# Patient Record
Sex: MALE | Race: WHITE | Employment: UNEMPLOYED | URBAN - METROPOLITAN AREA
[De-identification: names, ages, dates, MRNs, and addresses within clinical notes are randomized per-mention and may not be internally consistent; named-entity substitution may affect disease eponyms.]

---

## 2018-08-03 RX ORDER — M-VIT,TX,IRON,MINS/CALC/FOLIC 27MG-0.4MG
1 TABLET ORAL DAILY
COMMUNITY

## 2018-08-03 RX ORDER — LOSARTAN POTASSIUM 100 MG/1
100 TABLET ORAL DAILY
COMMUNITY

## 2018-08-03 RX ORDER — METOPROLOL SUCCINATE 25 MG/1
25 TABLET, EXTENDED RELEASE ORAL DAILY
COMMUNITY

## 2018-08-03 RX ORDER — BRIMONIDINE TARTRATE, TIMOLOL MALEATE 2; 5 MG/ML; MG/ML
1 SOLUTION/ DROPS OPHTHALMIC 2 TIMES DAILY
COMMUNITY
End: 2018-10-30 | Stop reason: ALTCHOICE

## 2018-08-03 RX ORDER — OXYCODONE HYDROCHLORIDE AND ACETAMINOPHEN 5; 325 MG/1; MG/1
1 TABLET ORAL
COMMUNITY
End: 2018-10-30 | Stop reason: ALTCHOICE

## 2018-08-03 RX ORDER — FUROSEMIDE 20 MG/1
20 TABLET ORAL DAILY
COMMUNITY

## 2018-08-03 RX ORDER — PREDNISOLONE ACETATE 10 MG/ML
1 SUSPENSION/ DROPS OPHTHALMIC PRN
COMMUNITY

## 2018-08-06 ENCOUNTER — ANESTHESIA (OUTPATIENT)
Dept: OPERATING ROOM | Age: 63
End: 2018-08-06
Payer: MEDICAID

## 2018-08-06 ENCOUNTER — HOSPITAL ENCOUNTER (OUTPATIENT)
Age: 63
Setting detail: OUTPATIENT SURGERY
Discharge: HOME | End: 2018-08-06
Attending: OPHTHALMOLOGY | Admitting: OPHTHALMOLOGY
Payer: MEDICAID

## 2018-08-06 ENCOUNTER — ANESTHESIA EVENT (OUTPATIENT)
Dept: OPERATING ROOM | Age: 63
End: 2018-08-06
Payer: MEDICAID

## 2018-08-06 VITALS
DIASTOLIC BLOOD PRESSURE: 37 MMHG | OXYGEN SATURATION: 100 % | RESPIRATION RATE: 10 BRPM | SYSTOLIC BLOOD PRESSURE: 72 MMHG

## 2018-08-06 VITALS
OXYGEN SATURATION: 97 % | BODY MASS INDEX: 37.19 KG/M2 | WEIGHT: 315 LBS | RESPIRATION RATE: 16 BRPM | TEMPERATURE: 97.2 F | HEIGHT: 77 IN | DIASTOLIC BLOOD PRESSURE: 94 MMHG | SYSTOLIC BLOOD PRESSURE: 133 MMHG | HEART RATE: 54 BPM

## 2018-08-06 PROBLEM — H34.8192 CRVO (CENTRAL RETINAL VEIN OCCLUSION): Status: ACTIVE | Noted: 2018-08-06

## 2018-08-06 PROBLEM — H40.51X3 NEOVASCULAR GLAUCOMA OF RIGHT EYE, SEVERE STAGE: Status: ACTIVE | Noted: 2018-08-06

## 2018-08-06 LAB
EKG ATRIAL RATE: 56 BPM
EKG P AXIS: 75 DEGREES
EKG P-R INTERVAL: 172 MS
EKG Q-T INTERVAL: 418 MS
EKG QRS DURATION: 90 MS
EKG QTC CALCULATION (BAZETT): 403 MS
EKG R AXIS: -11 DEGREES
EKG T AXIS: 68 DEGREES
EKG VENTRICULAR RATE: 56 BPM
GFR NON-AFRICAN AMERICAN: 51 ML/MIN
GFR SERPL CREATININE-BSD FRML MDRD: >60 ML/MIN
GFR SERPL CREATININE-BSD FRML MDRD: ABNORMAL ML/MIN/{1.73_M2}
GLUCOSE BLD-MCNC: 104 MG/DL (ref 74–100)
POC CHLORIDE: 114 MMOL/L (ref 98–107)
POC CREATININE: 1.4 MG/DL (ref 0.51–1.19)
POC HEMATOCRIT: 46 % (ref 41–53)
POC HEMOGLOBIN: 15.8 G/DL (ref 13.5–17.5)
POC IONIZED CALCIUM: 1.27 MMOL/L (ref 1.15–1.33)
POC POTASSIUM: 4 MMOL/L (ref 3.5–4.5)
POC SODIUM: 144 MMOL/L (ref 138–146)

## 2018-08-06 PROCEDURE — 6360000002 HC RX W HCPCS: Performed by: NURSE ANESTHETIST, CERTIFIED REGISTERED

## 2018-08-06 PROCEDURE — C1783 OCULAR IMP, AQUEOUS DRAIN DE: HCPCS | Performed by: OPHTHALMOLOGY

## 2018-08-06 PROCEDURE — 3700000000 HC ANESTHESIA ATTENDED CARE: Performed by: OPHTHALMOLOGY

## 2018-08-06 PROCEDURE — 84295 ASSAY OF SERUM SODIUM: CPT

## 2018-08-06 PROCEDURE — 3600000004 HC SURGERY LEVEL 4 BASE: Performed by: OPHTHALMOLOGY

## 2018-08-06 PROCEDURE — 7100000041 HC SPAR PHASE II RECOVERY - ADDTL 15 MIN: Performed by: OPHTHALMOLOGY

## 2018-08-06 PROCEDURE — 2500000003 HC RX 250 WO HCPCS: Performed by: NURSE ANESTHETIST, CERTIFIED REGISTERED

## 2018-08-06 PROCEDURE — 85014 HEMATOCRIT: CPT

## 2018-08-06 PROCEDURE — 3600000014 HC SURGERY LEVEL 4 ADDTL 15MIN: Performed by: OPHTHALMOLOGY

## 2018-08-06 PROCEDURE — 2580000003 HC RX 258: Performed by: OPHTHALMOLOGY

## 2018-08-06 PROCEDURE — 82947 ASSAY GLUCOSE BLOOD QUANT: CPT

## 2018-08-06 PROCEDURE — 82330 ASSAY OF CALCIUM: CPT

## 2018-08-06 PROCEDURE — 93005 ELECTROCARDIOGRAM TRACING: CPT

## 2018-08-06 PROCEDURE — 6370000000 HC RX 637 (ALT 250 FOR IP): Performed by: OPHTHALMOLOGY

## 2018-08-06 PROCEDURE — 2709999900 HC NON-CHARGEABLE SUPPLY: Performed by: OPHTHALMOLOGY

## 2018-08-06 PROCEDURE — 82565 ASSAY OF CREATININE: CPT

## 2018-08-06 PROCEDURE — 82435 ASSAY OF BLOOD CHLORIDE: CPT

## 2018-08-06 PROCEDURE — 6360000002 HC RX W HCPCS: Performed by: OPHTHALMOLOGY

## 2018-08-06 PROCEDURE — 7100000040 HC SPAR PHASE II RECOVERY - FIRST 15 MIN: Performed by: OPHTHALMOLOGY

## 2018-08-06 PROCEDURE — 2500000003 HC RX 250 WO HCPCS: Performed by: OPHTHALMOLOGY

## 2018-08-06 PROCEDURE — 84132 ASSAY OF SERUM POTASSIUM: CPT

## 2018-08-06 PROCEDURE — 3700000001 HC ADD 15 MINUTES (ANESTHESIA): Performed by: OPHTHALMOLOGY

## 2018-08-06 DEVICE — GLAUCOMA SHUNT
Type: IMPLANTABLE DEVICE | Status: FUNCTIONAL
Brand: AHMED™ GLAUCOMA VALVE

## 2018-08-06 RX ORDER — OXYCODONE HYDROCHLORIDE AND ACETAMINOPHEN 5; 325 MG/1; MG/1
2 TABLET ORAL PRN
Status: DISCONTINUED | OUTPATIENT
Start: 2018-08-06 | End: 2018-08-06 | Stop reason: HOSPADM

## 2018-08-06 RX ORDER — PROPOFOL 10 MG/ML
INJECTION, EMULSION INTRAVENOUS PRN
Status: DISCONTINUED | OUTPATIENT
Start: 2018-08-06 | End: 2018-08-06 | Stop reason: SDUPTHER

## 2018-08-06 RX ORDER — MEPERIDINE HYDROCHLORIDE 50 MG/ML
12.5 INJECTION INTRAMUSCULAR; INTRAVENOUS; SUBCUTANEOUS EVERY 5 MIN PRN
Status: DISCONTINUED | OUTPATIENT
Start: 2018-08-06 | End: 2018-08-06 | Stop reason: HOSPADM

## 2018-08-06 RX ORDER — BALANCED SALT SOLUTION ENRICHED WITH BICARBONATE, DEXTROSE, AND GLUTATHIONE
KIT INTRAOCULAR PRN
Status: DISCONTINUED | OUTPATIENT
Start: 2018-08-06 | End: 2018-08-06 | Stop reason: HOSPADM

## 2018-08-06 RX ORDER — MORPHINE SULFATE 2 MG/ML
2 INJECTION, SOLUTION INTRAMUSCULAR; INTRAVENOUS EVERY 5 MIN PRN
Status: DISCONTINUED | OUTPATIENT
Start: 2018-08-06 | End: 2018-08-06 | Stop reason: HOSPADM

## 2018-08-06 RX ORDER — TETRACAINE HYDROCHLORIDE 5 MG/ML
SOLUTION OPHTHALMIC PRN
Status: DISCONTINUED | OUTPATIENT
Start: 2018-08-06 | End: 2018-08-06 | Stop reason: HOSPADM

## 2018-08-06 RX ORDER — FENTANYL CITRATE 50 UG/ML
INJECTION, SOLUTION INTRAMUSCULAR; INTRAVENOUS PRN
Status: DISCONTINUED | OUTPATIENT
Start: 2018-08-06 | End: 2018-08-06 | Stop reason: SDUPTHER

## 2018-08-06 RX ORDER — CYCLOPENTOLATE HYDROCHLORIDE 10 MG/ML
1 SOLUTION/ DROPS OPHTHALMIC EVERY 5 MIN PRN
Status: COMPLETED | OUTPATIENT
Start: 2018-08-06 | End: 2018-08-06

## 2018-08-06 RX ORDER — BUPIVACAINE HYDROCHLORIDE 7.5 MG/ML
INJECTION, SOLUTION EPIDURAL; RETROBULBAR PRN
Status: DISCONTINUED | OUTPATIENT
Start: 2018-08-06 | End: 2018-08-06 | Stop reason: HOSPADM

## 2018-08-06 RX ORDER — LABETALOL HYDROCHLORIDE 5 MG/ML
5 INJECTION, SOLUTION INTRAVENOUS EVERY 10 MIN PRN
Status: DISCONTINUED | OUTPATIENT
Start: 2018-08-06 | End: 2018-08-06 | Stop reason: HOSPADM

## 2018-08-06 RX ORDER — TOBRAMYCIN AND DEXAMETHASONE 3; 1 MG/ML; MG/ML
1 SUSPENSION/ DROPS OPHTHALMIC
Status: COMPLETED | OUTPATIENT
Start: 2018-08-06 | End: 2018-08-06

## 2018-08-06 RX ORDER — PHENYLEPHRINE HYDROCHLORIDE 100 MG/ML
1 SOLUTION/ DROPS OPHTHALMIC EVERY 5 MIN PRN
Status: COMPLETED | OUTPATIENT
Start: 2018-08-06 | End: 2018-08-06

## 2018-08-06 RX ORDER — DIPHENHYDRAMINE HYDROCHLORIDE 50 MG/ML
12.5 INJECTION INTRAMUSCULAR; INTRAVENOUS
Status: DISCONTINUED | OUTPATIENT
Start: 2018-08-06 | End: 2018-08-06 | Stop reason: HOSPADM

## 2018-08-06 RX ORDER — ERYTHROMYCIN 5 MG/G
OINTMENT OPHTHALMIC PRN
Status: DISCONTINUED | OUTPATIENT
Start: 2018-08-06 | End: 2018-08-06 | Stop reason: HOSPADM

## 2018-08-06 RX ORDER — LIDOCAINE HYDROCHLORIDE 20 MG/ML
INJECTION, SOLUTION INFILTRATION; PERINEURAL PRN
Status: DISCONTINUED | OUTPATIENT
Start: 2018-08-06 | End: 2018-08-06 | Stop reason: HOSPADM

## 2018-08-06 RX ORDER — LIDOCAINE HYDROCHLORIDE 10 MG/ML
INJECTION, SOLUTION INFILTRATION; PERINEURAL PRN
Status: DISCONTINUED | OUTPATIENT
Start: 2018-08-06 | End: 2018-08-06 | Stop reason: SDUPTHER

## 2018-08-06 RX ORDER — CEFTAZIDIME 1 G/1
INJECTION, POWDER, FOR SOLUTION INTRAMUSCULAR; INTRAVENOUS PRN
Status: DISCONTINUED | OUTPATIENT
Start: 2018-08-06 | End: 2018-08-06 | Stop reason: HOSPADM

## 2018-08-06 RX ORDER — MIDAZOLAM HYDROCHLORIDE 1 MG/ML
INJECTION INTRAMUSCULAR; INTRAVENOUS PRN
Status: DISCONTINUED | OUTPATIENT
Start: 2018-08-06 | End: 2018-08-06 | Stop reason: SDUPTHER

## 2018-08-06 RX ORDER — FENTANYL CITRATE 50 UG/ML
25 INJECTION, SOLUTION INTRAMUSCULAR; INTRAVENOUS EVERY 5 MIN PRN
Status: DISCONTINUED | OUTPATIENT
Start: 2018-08-06 | End: 2018-08-06 | Stop reason: HOSPADM

## 2018-08-06 RX ORDER — ONDANSETRON 2 MG/ML
4 INJECTION INTRAMUSCULAR; INTRAVENOUS
Status: DISCONTINUED | OUTPATIENT
Start: 2018-08-06 | End: 2018-08-06 | Stop reason: HOSPADM

## 2018-08-06 RX ORDER — FENTANYL CITRATE 50 UG/ML
50 INJECTION, SOLUTION INTRAMUSCULAR; INTRAVENOUS EVERY 5 MIN PRN
Status: DISCONTINUED | OUTPATIENT
Start: 2018-08-06 | End: 2018-08-06 | Stop reason: HOSPADM

## 2018-08-06 RX ORDER — HYDRALAZINE HYDROCHLORIDE 20 MG/ML
5 INJECTION INTRAMUSCULAR; INTRAVENOUS EVERY 10 MIN PRN
Status: DISCONTINUED | OUTPATIENT
Start: 2018-08-06 | End: 2018-08-06 | Stop reason: HOSPADM

## 2018-08-06 RX ORDER — SODIUM CHLORIDE, SODIUM LACTATE, POTASSIUM CHLORIDE, CALCIUM CHLORIDE 600; 310; 30; 20 MG/100ML; MG/100ML; MG/100ML; MG/100ML
INJECTION, SOLUTION INTRAVENOUS CONTINUOUS
Status: DISCONTINUED | OUTPATIENT
Start: 2018-08-06 | End: 2018-08-06 | Stop reason: HOSPADM

## 2018-08-06 RX ORDER — OXYCODONE HYDROCHLORIDE AND ACETAMINOPHEN 5; 325 MG/1; MG/1
1 TABLET ORAL PRN
Status: DISCONTINUED | OUTPATIENT
Start: 2018-08-06 | End: 2018-08-06 | Stop reason: HOSPADM

## 2018-08-06 RX ADMIN — PROPOFOL 50 MG: 10 INJECTION, EMULSION INTRAVENOUS at 11:29

## 2018-08-06 RX ADMIN — MIDAZOLAM HYDROCHLORIDE 1 MG: 1 INJECTION, SOLUTION INTRAMUSCULAR; INTRAVENOUS at 11:47

## 2018-08-06 RX ADMIN — PHENYLEPHRINE HYDROCHLORIDE 1 DROP: 100 SOLUTION/ DROPS OPHTHALMIC at 09:29

## 2018-08-06 RX ADMIN — TOBRAMYCIN AND DEXAMETHASONE 1 DROP: 3; 1 SUSPENSION/ DROPS OPHTHALMIC at 09:50

## 2018-08-06 RX ADMIN — PHENYLEPHRINE HYDROCHLORIDE 1 DROP: 100 SOLUTION/ DROPS OPHTHALMIC at 09:35

## 2018-08-06 RX ADMIN — CYCLOPENTOLATE HYDROCHLORIDE 1 DROP: 10 SOLUTION/ DROPS OPHTHALMIC at 09:29

## 2018-08-06 RX ADMIN — CYCLOPENTOLATE HYDROCHLORIDE 1 DROP: 10 SOLUTION/ DROPS OPHTHALMIC at 09:40

## 2018-08-06 RX ADMIN — MIDAZOLAM HYDROCHLORIDE 1 MG: 1 INJECTION, SOLUTION INTRAMUSCULAR; INTRAVENOUS at 11:25

## 2018-08-06 RX ADMIN — SODIUM CHLORIDE, POTASSIUM CHLORIDE, SODIUM LACTATE AND CALCIUM CHLORIDE: 600; 310; 30; 20 INJECTION, SOLUTION INTRAVENOUS at 09:30

## 2018-08-06 RX ADMIN — FENTANYL CITRATE 50 MCG: 50 INJECTION INTRAMUSCULAR; INTRAVENOUS at 12:25

## 2018-08-06 RX ADMIN — CYCLOPENTOLATE HYDROCHLORIDE 1 DROP: 10 SOLUTION/ DROPS OPHTHALMIC at 09:35

## 2018-08-06 RX ADMIN — PHENYLEPHRINE HYDROCHLORIDE 1 DROP: 100 SOLUTION/ DROPS OPHTHALMIC at 09:40

## 2018-08-06 RX ADMIN — LIDOCAINE HYDROCHLORIDE 50 MG: 10 INJECTION, SOLUTION INFILTRATION; PERINEURAL at 11:29

## 2018-08-06 RX ADMIN — FENTANYL CITRATE 50 MCG: 50 INJECTION INTRAMUSCULAR; INTRAVENOUS at 11:49

## 2018-08-06 ASSESSMENT — PULMONARY FUNCTION TESTS
PIF_VALUE: 0
PIF_VALUE: 1
PIF_VALUE: 0
PIF_VALUE: 1
PIF_VALUE: 0
PIF_VALUE: 1
PIF_VALUE: 0
PIF_VALUE: 1
PIF_VALUE: 0
PIF_VALUE: 1
PIF_VALUE: 1
PIF_VALUE: 0
PIF_VALUE: 1
PIF_VALUE: 0
PIF_VALUE: 1
PIF_VALUE: 0
PIF_VALUE: 1
PIF_VALUE: 0
PIF_VALUE: 1
PIF_VALUE: 0
PIF_VALUE: 1
PIF_VALUE: 0
PIF_VALUE: 0

## 2018-08-06 ASSESSMENT — PAIN SCALES - GENERAL
PAINLEVEL_OUTOF10: 0
PAINLEVEL_OUTOF10: 0
PAINLEVEL_OUTOF10: 1

## 2018-08-06 ASSESSMENT — PAIN - FUNCTIONAL ASSESSMENT: PAIN_FUNCTIONAL_ASSESSMENT: 0-10

## 2018-08-06 NOTE — ANESTHESIA POSTPROCEDURE EVALUATION
Department of Anesthesiology  Postprocedure Note    Patient: Nazanin Jacobson  MRN: [id number]  YOB: 1955  Date of evaluation: 8/6/2018  Time:  1:18 PM     Procedure Summary     Date:  08/06/18 Room / Location:  [facility] OR  / [facility] OR    Anesthesia Start:  1120 Anesthesia Stop:  5325    Procedure:  VITRECTOMY 25 GAUGE, ENDOLASER 200 MWATTS, 200 MSECONDS DURATION, 350 PULSES, AHMED VALVE (Right ) Diagnosis:  (CENTRAL RETINAL VEIN OCCULSION, NEOVASCULAR GLAUCOMA RIGHT EYE )    Surgeon:  Freddie Rodriguez MD Responsible Provider:  Milton Alvarado MD    Anesthesia Type:  MAC ASA Status:  3          Anesthesia Type: MAC    Elian Phase I:      Elian Phase II: Elian Score: 10    Last vitals: Reviewed and per EMR flowsheets.        Anesthesia Post Evaluation    Patient location during evaluation: PACU  Patient participation: complete - patient participated  Level of consciousness: awake and alert  Pain score: 2  Airway patency: patent  Complications: no  Cardiovascular status: hemodynamically stable  Respiratory status: acceptable  Hydration status: stable

## 2018-08-06 NOTE — ANESTHESIA PRE PROCEDURE
Known Allergies    Problem List:  There is no problem list on file for this patient. Past Medical History:        Diagnosis Date    Arthritis     RIGHT KNEE    Eye pain, right     Glaucoma     RETINAL VEIN OCCLUSION STARTED 9861-8299 INDUCED GLAUCOMA    Hyperlipidemia     Hypertension     Poor vision     CAN ONLY SEE LIGHT THROUGH RIGHT EYE    Retinal vein occlusion 0566-3528    INDUCED GLAUCOMA    Wears glasses     Wears hearing aid     BILATERAL EARS       Past Surgical History:        Procedure Laterality Date    EYE SURGERY Right     CATARACT WITH IOL PLACED    FOOT SURGERY Left 1972    BONE CHIP REMOVED FROM GREAT TOE    HERNIA REPAIR Right     AS A BABY    SINUS SURGERY  1996    TONSILLECTOMY  1960       Social History:    Social History   Substance Use Topics    Smoking status: Former Smoker     Packs/day: 1.00     Years: 5.00     Types: Cigarettes     Quit date: 1987    Smokeless tobacco: Never Used    Alcohol use No                                Counseling given: No      Vital Signs (Current):   Vitals:    08/03/18 1334 08/06/18 0909 08/06/18 0934   BP:   (!) 147/98   Pulse:   58   Resp:   16   Temp:   97.9 °F (36.6 °C)   TempSrc:   Temporal   SpO2:   99%   Weight: (!) 389 lb (176.4 kg) (!) 394 lb 10 oz (179 kg)    Height: 6' 5\" (1.956 m) 6' 5\" (1.956 m)                                               BP Readings from Last 3 Encounters:   08/06/18 (!) 147/98       NPO Status: Time of last liquid consumption: 2359                        Time of last solid consumption: 2359                        Date of last liquid consumption: 08/05/18                        Date of last solid food consumption: 08/05/18    BMI:   Wt Readings from Last 3 Encounters:   08/06/18 (!) 394 lb 10 oz (179 kg)     Body mass index is 46.8 kg/m².     CBC: No results found for: WBC, RBC, HGB, HCT, MCV, RDW, PLT    CMP:   Lab Results   Component Value Date    CREATININE 1.40 08/06/2018    LABGLOM 51 08/06/2018 POC Tests:   Recent Labs      08/06/18   0946   POCGLU  104*   POCNA  144   POCK  4.0   POCCL  114*   POCHEMO  15.8   POCHCT  46       Coags: No results found for: PROTIME, INR, APTT    HCG (If Applicable): No results found for: PREGTESTUR, PREGSERUM, HCG, HCGQUANT     ABGs: No results found for: PHART, PO2ART, CJX3CLM, YIN1KFI, BEART, T0MQHMNB     Type & Screen (If Applicable):  No results found for: LABABO, LABRH    Anesthesia Evaluation    Airway: Mallampati: III  TM distance: >3 FB   Neck ROM: full  Mouth opening: < 3 FB Dental:          Pulmonary:   (+) decreased breath sounds,                            ROS comment: ? FABRICIO   Cardiovascular:    (+) hypertension:,         Rhythm: regular  Rate: normal                    Neuro/Psych:   Negative Neuro/Psych ROS              GI/Hepatic/Renal:             Endo/Other:                      ROS comment: Morbid Obesity Abdominal:   (+) obese,         Vascular:                                        Anesthesia Plan      MAC     ASA 3     (HTN  Morbid Obesity)  Induction: intravenous. MIPS: Postoperative opioids intended. Anesthetic plan and risks discussed with patient. Plan discussed with CRNA.                   Javier Harkins MD   8/6/2018

## 2018-08-06 NOTE — H&P
History and Physical    Pt Name: Alexis Kaufman  MRN: [id number]  YOB: 1955  Date of evaluation: 2018  Primary Care Physician: Mari Pearson MD  Patient evaluated at the request of  Dr. Praneeth Ibarra    Reason for evaluation:  right Eye  Neovascular Glaucoma   SUBJECTIVE:   History of Chief Complaint:      Milton Lira is a 58 y.o. male   Hx of pain in his his right eye approx 6 days ago , s/p right eye in 2016 for retinal detachment , denies any head trauma , says his last IOP was 36 . Past Medical History      has a past medical history of Arthritis; Eye pain, right; Glaucoma; Hyperlipidemia; Hypertension; Poor vision; Retinal vein occlusion; Wears glasses; and Wears hearing aid. Past Surgical History   has a past surgical history that includes Tonsillectomy (); hernia repair (Right); eye surgery (Right); Foot surgery (Left, ); and sinus surgery (). Medications   Scheduled Meds:   tobramycin-dexamethasone  1 drop Right Eye On Call to OR     Continuous Infusions:  PRN Meds:.cyclopentolate, phenylephrine  Allergies  has No Known Allergies. Family History    family history includes Cancer in his father. Family Status   Relation Status    Mother     Father          Social History  Social History     Social History    Marital status:      Spouse name: N/A    Number of children: N/A    Years of education: N/A     Occupational History    Not on file. Social History Main Topics    Smoking status: Former Smoker     Packs/day: 1.00     Years: 5.00     Types: Cigarettes     Quit date:     Smokeless tobacco: Never Used    Alcohol use No    Drug use: No    Sexual activity: Not on file     Other Topics Concern    Not on file     Social History Narrative    No narrative on file                 Hobbies:  Sings and plays guitar     OBJECTIVE:   VITALS:  height is 6' 5\" (1.956 m) and weight is 394 lb 10 oz (179 kg) (abnormal). CONSTITUTIONAL: Alert and oriented times 3, no acute distress and cooperative to examination. friendly and pleasant     SKIN: rash No    HEENT: Head is normocephalic, atraumatic. EOMI, PERRLA    Oral air way : narrow Yes    NECK: neck supple, no lymphadenopathy noted, trachea midline and straight       2+ carotid, no bruit    LUNGS: Chest expands equally bilaterally upon respiration, no accessory muscle used. Ausculation reveals no adventitious breath sounds. CARDIOVASCULAR: \"Heart sounds are normal.  Regular rate and rhythm without murmur,    ABDOMEN: Bowel sounds are present in all four quadrants  , obese    GENATALIA:Deferred. NEUROLOGIC: \"CN II-XII are grossly intact. EXTREMITIES: Pitting edema:  No,  Varicose veins: No     Dorsal pedal/posterior tibial pulses palpable: Yes         Strength:  Normal       There is no problem list on file for this patient. IMPRESSIONS:   1.  right eye glaucoma   2.  does not have a problem list on file.     Damon [facility]  Electronically signed 8/6/2018 at 9:22 AM       Scheduled for: right   Eye   Surgery

## 2018-08-06 NOTE — BRIEF OP NOTE
Brief Postoperative Note    Brian Ware   8/6/2018    1:36 PM    BRIEF OPERATIVE NOTE    PREOP Diagnosis: Neovascular Glaucoma, CRVO Right Eye    POSTOP Diagnosis:  Same    Procedure: Vitrectomy, Ahmed Valve, PanRetinal Endolaser, Right Eye    Anesthesia:  MAC    Surgeon:  Doug Grimes MD    EBL:  Minimal    Fluids:  See anesthesia record    Drains:  None    Specimen:  None    Complications:  None    Electronically signed by Doug Grimes MD on 8/6/2018 at [place] [id number]   8/6/2018    1:36 PM

## 2018-08-07 NOTE — OP NOTE
[address] [facility]                                 OPERATIVE REPORT    PATIENT NAME: Rosetta Hess                      :        1955  MED REC NO:   [id number]                             ROOM:  ACCOUNT NO:   [de-identified]                           ADMIT DATE: 2018  PROVIDER:     Keo Cole    DATE OF PROCEDURE:  2018    PREOPERATIVE DIAGNOSES:  1.  Neovascular glaucoma, right eye, severe stage. 2.  Central retinal vein occlusion, right eye. 3.  Status post previous vitrectomy for retinal detachment, right eye. 4.  Posterior chamber pseudophakia, right eye. SURGEON:  Keo Cole MD    REASON FOR OPERATION:  The patient is a 80-year-old gentleman who has had  previous successful repair for retinal detachment in the past.   Approximately 1 year ago, he developed central retinal vein occlusion in  the right eye. Over the last 2 weeks, he has noticed increasing pain in  the right eye and was found to have neovascular glaucoma with highly  elevated intraocular pressure. Maximum medical treatment did not control  his pressure. I felt it was necessary to proceed with an Ahmed valve to  prevent complete vision loss. His insurance was such that he could not get  an appointment with the glaucoma doctors. He understands the risks  include, but are not limited to, bleeding, infection, recurrent retinal  detachment, and uncontrolled glaucoma, which could lead to blindness. DESCRIPTION OF PROCEDURE:  The patient was brought to the operating room in  good condition. He was administered local anesthetic and prepped and  draped in the usual fashion. 25-gauge vitrectomy trocars were placed  inferotemporally and superonasally. A fornix-based conjunctival incision  was made superotemporally with a relaxing incision horizontally.   Curved  Ramos scissors were used to open the sub-Tenon's area posterior to the  muscle insertions. 4-0 black silk traction sutures were placed through the  superior rectus and medial rectus for traction. A curved crescent blade  was used to make a tunnel from 6 mm posterior to the limbus up to 3 mm  posterior to the limbus at the middle of the superotemporal quadrant. I  did not enter the eye at this time. A sonali was placed 9 mm posterior to  the limbus with diathermy. 8-0 nylon sutures were then placed 9 mm  posterior to the limbus at the width of the islets in the Ahmed valve. The  superotemporal trocar was placed just above the midline. The infusion was  then attached to the inferotemporal site. Light pipe and ocutome were  placed through the superior sites. There was minimal vitreous remaining. I used scleral depression to clean all vitreous in the superotemporal and  inferotemporal areas. I tested for residual attached vitreous, there was  none. I did panretinal endolaser from the arcades out to the equator. To  the equator, there was laser from the previous retinal detachment surgery. The Ahmed PF-7 valve was then placed posteriorly in the superotemporal  quadrant. The previously placed 8-0 nylon sutures were brought out through  the islets of the valve, and the valve was tied in place. The tube had  previously been primed with balanced salt solution. It was re-primed after  suturing the Ahmed in place. I measured 2 mm onto the cornea with the tube  and then cut the tube at a slant so that the opening would face more  posteriorly. The crescent blade was then reintroduced into the tunnel  incision and brought into the vitreous cavity. The tube was then carefully  brought back and placed through the tunnel and into the vitreous. The tube  could be seen in place. It did not appear to be rubbing up against any  structures. An 8-0 nylon suture was placed through the external tunnel  opening adjacent to the tube.   The conjunctiva was then sewn in

## 2018-09-28 LAB
ALBUMIN SERPL-MCNC: NORMAL G/DL
ALP BLD-CCNC: NORMAL U/L
ALT SERPL-CCNC: 58 U/L
ANION GAP SERPL CALCULATED.3IONS-SCNC: NORMAL MMOL/L
AST SERPL-CCNC: 34 U/L
AVERAGE GLUCOSE: NORMAL
BILIRUB SERPL-MCNC: 0.3 MG/DL (ref 0.1–1.4)
BUN BLDV-MCNC: NORMAL MG/DL
CALCIUM SERPL-MCNC: NORMAL MG/DL
CHLORIDE BLD-SCNC: NORMAL MMOL/L
CHOLESTEROL, TOTAL: 161 MG/DL
CHOLESTEROL/HDL RATIO: ABNORMAL
CO2: 28 MMOL/L
CREAT SERPL-MCNC: 1.2 MG/DL
GFR CALCULATED: NORMAL
GLUCOSE BLD-MCNC: 90 MG/DL
HBA1C MFR BLD: 5.4 %
HDLC SERPL-MCNC: 33 MG/DL (ref 35–70)
LDL CHOLESTEROL CALCULATED: 99 MG/DL (ref 0–160)
POTASSIUM SERPL-SCNC: 4.1 MMOL/L
PROSTATE SPECIFIC ANTIGEN: 5.34 NG/ML
SODIUM BLD-SCNC: 143 MMOL/L
TOTAL PROTEIN: NORMAL
TRIGL SERPL-MCNC: 143 MG/DL
VLDLC SERPL CALC-MCNC: ABNORMAL MG/DL

## 2018-10-29 NOTE — PROGRESS NOTES
are equal, round, and reactive to light. EOM are normal.   Neck: Normal range of motion. Neck supple. Cardiovascular: Normal rate and regular rhythm. Pulmonary/Chest: Effort normal and breath sounds normal. No respiratory distress. He has no wheezes. Abdominal: Soft. Bowel sounds are normal. There is no tenderness. Genitourinary:   Genitourinary Comments: MARCO with enlarged prostate with palpable nodule   Musculoskeletal: Normal range of motion. Neurological: He is alert and oriented to person, place, and time. No cranial nerve deficit. Skin: Skin is warm and dry. He is not diaphoretic. Psychiatric: He has a normal mood and affect. His behavior is normal. Judgment and thought content normal.       POC  No results found for this visit on 10/30/18. Assessment:   Elevated PSA  Prostate nodule  BPH with LUTs    Plan:     Urine dip today + nitrites. Send for culture. Pt has palpable prostate nodule on exam.  Recommend proceeding with prostate biopsy at this time. I described the procedure in detail and also described the associated risks and benefits at length. We discussed possible alternative therapies. We discussed the risks and benefits of not undergoing therapy. Patient understands these risks and benefits and desires to proceed. We will schedule Huseyin Larsen for a prostate biopsy in the office by Dr. Andrews Haines in the next couple weeks. He was given a prescription for Cipro 500 mg to be taken BID the day before, day of, and day after the biopsy. Gentamicin was sent to his pharmacy. He will bring this to the biopsy appointment for IM injection. He will do an enema the morning of the biopsy. He will stop all anti-coagulants a few days prior to the biopsy. The patient is aware to seek treatment if he would develop a fever or chills the night of the biopsy. He will return a few weeks after the biopsy for results and further recommendations. Pt has an upcoming cardiac catheterization on Monday. If he gets cardiac stents he likely will be unable to undergo biopsy for a year. Pt to update us on results of cath. Pt having significant LUTS with IPSS score today of 24/35. We will trial Flomax. If no improvement in symptoms with Flomax will schedule cystoscopy to eval need for TURP. Pt to be scheduled for TRUS prostate biopsy with Dr. Kenia Dodd in the next few weeks.

## 2018-10-30 ENCOUNTER — TELEPHONE (OUTPATIENT)
Dept: UROLOGY | Age: 63
End: 2018-10-30

## 2018-10-30 ENCOUNTER — OFFICE VISIT (OUTPATIENT)
Dept: UROLOGY | Age: 63
End: 2018-10-30
Payer: OTHER GOVERNMENT

## 2018-10-30 VITALS
SYSTOLIC BLOOD PRESSURE: 136 MMHG | BODY MASS INDEX: 37.19 KG/M2 | WEIGHT: 315 LBS | DIASTOLIC BLOOD PRESSURE: 88 MMHG | HEIGHT: 77 IN

## 2018-10-30 DIAGNOSIS — R97.20 ELEVATED PSA: Primary | ICD-10-CM

## 2018-10-30 DIAGNOSIS — R82.81 PYURIA: ICD-10-CM

## 2018-10-30 DIAGNOSIS — R35.1 NOCTURIA: ICD-10-CM

## 2018-10-30 LAB
BILIRUBIN URINE: NEGATIVE
BLOOD URINE, POC: NEGATIVE
CHARACTER, URINE: CLEAR
COLOR, URINE: YELLOW
GLUCOSE URINE: NEGATIVE MG/DL
KETONES, URINE: NEGATIVE
LEUKOCYTE CLUMPS, URINE: ABNORMAL
NITRITE, URINE: POSITIVE
PH, URINE: 5.5
POST VOID RESIDUAL (PVR): 0 ML
PROTEIN, URINE: NEGATIVE MG/DL
SPECIFIC GRAVITY, URINE: 1.01 (ref 1–1.03)
UROBILINOGEN, URINE: 0.2 EU/DL

## 2018-10-30 PROCEDURE — 99204 OFFICE O/P NEW MOD 45 MIN: CPT | Performed by: NURSE PRACTITIONER

## 2018-10-30 PROCEDURE — 51798 US URINE CAPACITY MEASURE: CPT | Performed by: NURSE PRACTITIONER

## 2018-10-30 PROCEDURE — 81003 URINALYSIS AUTO W/O SCOPE: CPT | Performed by: NURSE PRACTITIONER

## 2018-10-30 RX ORDER — TAMSULOSIN HYDROCHLORIDE 0.4 MG/1
0.4 CAPSULE ORAL DAILY
Qty: 30 CAPSULE | Refills: 3 | Status: SHIPPED | OUTPATIENT
Start: 2018-10-30 | End: 2019-03-21 | Stop reason: SDUPTHER

## 2018-10-30 RX ORDER — CIPROFLOXACIN 500 MG/1
500 TABLET, FILM COATED ORAL 2 TIMES DAILY
Qty: 6 TABLET | Refills: 0 | Status: SHIPPED | OUTPATIENT
Start: 2018-10-30 | End: 2018-10-30

## 2018-10-30 RX ORDER — GENTAMICIN SULFATE 40 MG/ML
80 INJECTION, SOLUTION INTRAMUSCULAR; INTRAVENOUS ONCE
Qty: 2 ML | Refills: 0 | Status: SHIPPED | OUTPATIENT
Start: 2018-10-30 | End: 2018-10-30

## 2018-10-30 RX ORDER — CIPROFLOXACIN 500 MG/1
500 TABLET, FILM COATED ORAL 2 TIMES DAILY
Qty: 6 TABLET | Refills: 0 | Status: SHIPPED | OUTPATIENT
Start: 2018-10-30 | End: 2018-11-02

## 2018-10-30 ASSESSMENT — ENCOUNTER SYMPTOMS
SHORTNESS OF BREATH: 1
GASTROINTESTINAL NEGATIVE: 1
NAUSEA: 0
CHEST TIGHTNESS: 0
CONSTIPATION: 0
ABDOMINAL PAIN: 0
VOMITING: 0
BACK PAIN: 0
EYES NEGATIVE: 1

## 2018-10-30 NOTE — PATIENT INSTRUCTIONS
Patient Education        Prostate Biopsy and Ultrasound: About This Test  What is it? A prostate biopsy is a type of test. Your doctor takes small tissue samples from your prostate gland. Then another doctor looks at the tissue under a microscope to see if there are cancer cells. This test is done by a doctor who specializes in men's genital and urinary problems (urologist). It can be done in your doctor's office, a day surgery clinic, or a hospital operating room. To get the tissue samples from the prostate, the doctor inserts a thin needle through the rectum, the urethra, or the area between the anus and scrotum (perineum). The most common method is through the rectum. Your doctor may use ultrasound to help guide the needle. Why is this test done? You may need a prostate biopsy if your doctor found something of concern during a digital rectal exam. Or you may need it if a blood test showed a high level of prostate-specific antigen (PSA). A biopsy can help find out if you have prostate cancer. How can you prepare for this test?  Before you have a prostate biopsy, tell your doctor if:  · You are taking any medicines or using any herbal supplements. · You are allergic to any medicines. · You have had bleeding problems, or you take aspirin or some other blood thinner. What happens before the test?  · You may need to have an enema before the test.  · You will need to take off all or most of your clothes. You will be given a cloth or paper gown to use during the test.  · You may be given a sedative through a vein (IV) in your arm. The sedative will help you relax and stay still. What happens during the test?  Through the rectum  · You may be asked to kneel, lie on your side, or lie on your back. · Your doctor may inject an anesthetic around the prostate to numb the area before the sample is taken. · Ultrasound is often used to guide the needle to the correct spot.   · Your doctor may choose to use a needle

## 2018-11-01 ENCOUNTER — TELEPHONE (OUTPATIENT)
Dept: UROLOGY | Age: 63
End: 2018-11-01

## 2018-11-01 LAB
ORGANISM: ABNORMAL
URINE CULTURE, ROUTINE: ABNORMAL

## 2018-11-01 RX ORDER — SULFAMETHOXAZOLE AND TRIMETHOPRIM 800; 160 MG/1; MG/1
1 TABLET ORAL 2 TIMES DAILY
Qty: 20 TABLET | Refills: 0 | Status: SHIPPED | OUTPATIENT
Start: 2018-11-01 | End: 2018-11-11

## 2018-12-10 ENCOUNTER — TELEPHONE (OUTPATIENT)
Dept: UROLOGY | Age: 63
End: 2018-12-10

## 2019-03-21 RX ORDER — TAMSULOSIN HYDROCHLORIDE 0.4 MG/1
0.4 CAPSULE ORAL DAILY
Qty: 90 CAPSULE | Refills: 3 | Status: SHIPPED | OUTPATIENT
Start: 2019-03-21

## (undated) DEVICE — AGENT VISCOELASTIC 1ML 2% HYDROXYPROPYL METHCELL PRELD GLS

## (undated) DEVICE — CORD,CAUTERY,BIPOLAR,STERILE: Brand: MEDLINE

## (undated) DEVICE — 25GA EZPASS SOFT TIP CANNULA BOX OF 5: Brand: VORTEX SURGICAL INC

## (undated) DEVICE — STANDARD HYPODERMIC NEEDLE,ALUMINUM HUB: Brand: MONOJECT

## (undated) DEVICE — Z DISCONTINUED NO SUB IDED SPONGE OPHTH EYE SPEAR SURG STRL

## (undated) DEVICE — NEEDLE FLTR 18GA L1.5IN MEM THK5UM BLNT DISP

## (undated) DEVICE — SOLUTION IRRIG 1000ML PENTALYTE PLAS POUR BTL TIS U SOL

## (undated) DEVICE — 1 EACH 40411 STERILE DISPOSABLE SUPER VIEW® LENS SET & 1 EACH 40100 STERILE MICROSCOPE DRAPE: Brand: SUPER VIEW® PACK

## (undated) DEVICE — GLOVE SURG SZ 65 THK91MIL LTX FREE SYN POLYISOPRENE

## (undated) DEVICE — PROBE BPLR 18GA 45DEG ANG BLNT TIP HEMSTAT ERAS WET-FIELD

## (undated) DEVICE — SYRINGE, LUER LOCK, 10ML: Brand: MEDLINE

## (undated) DEVICE — SUTURE PERMAHAND SZ 4-0 L30IN NONABSORBABLE BLK L17MM RB-1 K871H

## (undated) DEVICE — RETINA PK

## (undated) DEVICE — NEEDLE HYPO 30GA L0.5IN BGE POLYPR HUB S STL REG BVL STR

## (undated) DEVICE — SURGICAL PROCEDURE PACK 25 GA VITRECTOMY

## (undated) DEVICE — OCCLUDER EYE POLYETH HYPOALRG ADH TAPE W/O PINHOLE

## (undated) DEVICE — 6 ML SYRINGE LUER-LOCK TIP: Brand: MONOJECT

## (undated) DEVICE — CYCLOGEL 1% GTTS